# Patient Record
Sex: MALE | Race: WHITE | NOT HISPANIC OR LATINO | URBAN - METROPOLITAN AREA
[De-identification: names, ages, dates, MRNs, and addresses within clinical notes are randomized per-mention and may not be internally consistent; named-entity substitution may affect disease eponyms.]

---
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
## 2020-03-04 ENCOUNTER — OFFICE VISIT (OUTPATIENT)
Dept: FAMILY MEDICINE CLINIC | Facility: CLINIC | Age: 20
End: 2020-03-04
Payer: COMMERCIAL

## 2020-03-04 VITALS
BODY MASS INDEX: 20.36 KG/M2 | TEMPERATURE: 98.7 F | OXYGEN SATURATION: 98 % | RESPIRATION RATE: 20 BRPM | WEIGHT: 153.6 LBS | HEART RATE: 84 BPM | SYSTOLIC BLOOD PRESSURE: 110 MMHG | HEIGHT: 73 IN | DIASTOLIC BLOOD PRESSURE: 60 MMHG

## 2020-03-04 DIAGNOSIS — Z13.1 SCREENING FOR DIABETES MELLITUS: ICD-10-CM

## 2020-03-04 DIAGNOSIS — Z00.00 HEALTH MAINTENANCE EXAMINATION: Primary | ICD-10-CM

## 2020-03-04 DIAGNOSIS — F32.A DEPRESSION, UNSPECIFIED DEPRESSION TYPE: ICD-10-CM

## 2020-03-04 PROCEDURE — 99385 PREV VISIT NEW AGE 18-39: CPT | Performed by: FAMILY MEDICINE

## 2020-03-04 PROCEDURE — 3008F BODY MASS INDEX DOCD: CPT | Performed by: FAMILY MEDICINE

## 2020-03-04 NOTE — ASSESSMENT & PLAN NOTE
 Screening for diabetes indicated at this time  Pt agreeable   Will discuss STI screening at next visit   Flu shot indicated at this time  Pt states he would like to discuss this at our next visit  Will f/u   No cancer screening indicated at this time  Plan   BMP ordered   Will f/u regarding flu vaccine at next visit   Will discuss STI screening at next visit   F/u in 3 months
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
## 2020-03-04 NOTE — ASSESSMENT & PLAN NOTE
 Pt reports 5-6 year h/o sadness, hopelessness, and decreased interest  Has never told anyone this before   Denies thoughts of harming self or others  Plan   Referral sent to social work for assistance in finding behavioral therapy that will take patient's  insurance   Advised pt to call clinic or go to ED if he has thoughts of harming self or others  Pt agreeable

## 2020-03-05 ENCOUNTER — PATIENT OUTREACH (OUTPATIENT)
Dept: CASE MANAGEMENT | Facility: OTHER | Age: 20
End: 2020-03-05

## 2020-03-05 NOTE — PROGRESS NOTES
[facility] is responding to consult to assist patient with Mental Health follow-up with a provider that accepts his insurance  Telephone calls placed to [facility] and [facility] and verified that both these providers accept Corky Jean Baptiste  I also left a message for [employer] in intake at [facility] and am awaiting a return call  Telephone call placed to patient and message left on his VM requesting that he return my call at his convenience

## 2020-03-13 ENCOUNTER — PATIENT OUTREACH (OUTPATIENT)
Dept: CASE MANAGEMENT | Facility: OTHER | Age: 20
End: 2020-03-13

## 2020-03-13 NOTE — PROGRESS NOTES
Telephone call placed to patient at listed telephone number  However, a girl answered the phone and informed me that I had a wrong number  Attempted to reach  Antonio Pearce and message left on  requesting a return call  I am not sure that this is the correct number for [facility] because voice mail had a male voice  Telephone call placed to Dr Yani Palma office and spoke to Nurse, Thalia Torres  I informed her that I am unable to reach patient at the listed numbers in his chart  Thalia Torres verified that I had the correct telephone numbers  Thalia Torres informed me that she will attempt to contact patient and follow up with me

## 2020-03-24 ENCOUNTER — PATIENT OUTREACH (OUTPATIENT)
Dept: CASE MANAGEMENT | Facility: OTHER | Age: 20
End: 2020-03-24

## 2020-03-24 NOTE — PROGRESS NOTES
I have not heard back from patient and have not been able to contact him  I will be closing his case at this time  I will gladly assist him if he returns my call